# Patient Record
Sex: MALE | Race: WHITE | NOT HISPANIC OR LATINO | Employment: FULL TIME | URBAN - NONMETROPOLITAN AREA
[De-identification: names, ages, dates, MRNs, and addresses within clinical notes are randomized per-mention and may not be internally consistent; named-entity substitution may affect disease eponyms.]

---

## 2022-01-13 ENCOUNTER — OFFICE VISIT (OUTPATIENT)
Dept: NEUROLOGY | Facility: CLINIC | Age: 42
End: 2022-01-13

## 2022-01-13 VITALS
BODY MASS INDEX: 33.88 KG/M2 | TEMPERATURE: 98 F | HEART RATE: 76 BPM | DIASTOLIC BLOOD PRESSURE: 72 MMHG | WEIGHT: 264 LBS | SYSTOLIC BLOOD PRESSURE: 130 MMHG | OXYGEN SATURATION: 98 % | HEIGHT: 74 IN

## 2022-01-13 DIAGNOSIS — G43.009 MIGRAINE WITHOUT AURA AND WITHOUT STATUS MIGRAINOSUS, NOT INTRACTABLE: ICD-10-CM

## 2022-01-13 DIAGNOSIS — R51.9 CHRONIC DAILY HEADACHE: Primary | ICD-10-CM

## 2022-01-13 PROCEDURE — 99204 OFFICE O/P NEW MOD 45 MIN: CPT | Performed by: NURSE PRACTITIONER

## 2022-01-13 RX ORDER — TOPIRAMATE 25 MG/1
25 TABLET ORAL 2 TIMES DAILY
Qty: 60 TABLET | Refills: 1 | Status: SHIPPED | OUTPATIENT
Start: 2022-01-13 | End: 2022-03-10 | Stop reason: SINTOL

## 2022-01-13 RX ORDER — SUMATRIPTAN 50 MG/1
50 TABLET, FILM COATED ORAL ONCE AS NEEDED
Qty: 30 TABLET | Refills: 2 | Status: SHIPPED | OUTPATIENT
Start: 2022-01-13 | End: 2022-07-21

## 2022-01-13 RX ORDER — IBUPROFEN 200 MG
200 TABLET ORAL EVERY 6 HOURS PRN
COMMUNITY

## 2022-01-13 NOTE — PROGRESS NOTES
New Neurology Patient Office Visit      Patient Name: Carlos Garza    Referring Physician: Angelic Tan, *    Chief Complaint:    Chief Complaint   Patient presents with   • Advice Only     Pt in office for headaches       History of Present Illness: Carlos Garza is a 41 y.o. male who is here today to establish care with Neurology.  He was referred for evaluation of persistent daily headache.  Has been experiencing bifrontal headache for about one year.  Describes this as focused on sides of forehead.    Age onset: headaches since age early 30's, 1st migraine in past year.     Migraine Sx: Intense pain, photophobia, difficulty concentrating, mild nausea, right sided   Aleve may dull but does not completely alleviate    HAD/week: 7    Migraine frequnecy: 1 in past year    Meds tried: Aleve occasionally    Family Hx:no    Previous imaging:  Head CT, no acute intracranial abnormality  Spring allergies, none recurrent     No history of kidney stones  Has been taking Testosterone since 2017    Is concerned that he may had have 'exposure to something' while [place].   Last eye exam about 4 months ago    Reports poor sleep, typical time to bed 10pm and wakes by 5. Estimates 6 hours of uninterrupted sleep. Had previous sleep study, negative for ROBERTA. Rare daytime fatigue. May sleep up to 12 hours on off days.     Daily 1 Monster or Sunny, 1 soda at lunch , and tea throughout day    The following portions of the patient's history were reviewed and updated as appropriate: allergies, current medications, past family history, past medical history, past social history, past surgical history and problem list.    Subjective      Review of Systems:   Review of Systems   Neurological: Positive for headache.   All other systems reviewed and are negative.    Past Medical History: History reviewed. No pertinent past medical history.    Past Surgical History:   Past Surgical History:   Procedure Laterality Date   •  "APPENDECTOMY       Family History: History reviewed. No pertinent family history.    Social History:   Social History     Socioeconomic History   • Marital status: Single   Tobacco Use   • Smoking status: Never Smoker   • Smokeless tobacco: Never Used   Vaping Use   • Vaping Use: Never used   Substance and Sexual Activity   • Alcohol use: Never   • Drug use: Never   • Sexual activity: Defer     Medications:     Current Outpatient Medications:   •  ibuprofen (ADVIL,MOTRIN) 200 MG tablet, Take 200 mg by mouth Every 6 (Six) Hours As Needed for Mild Pain ., Disp: , Rfl:   •  NANDROLONE-TESTOSTERONE CYP&EN IJ, Inject  as directed., Disp: , Rfl:   •  SUMAtriptan (Imitrex) 50 MG tablet, Take 1 tablet by mouth 1 (One) Time As Needed for Migraine. Take one tablet at onset of headache. May repeat dose one time in 2 hours if headache not relieved., Disp: 30 tablet, Rfl: 2  •  topiramate (Topamax) 25 MG tablet, Take 1 tablet by mouth 2 (Two) Times a Day., Disp: 60 tablet, Rfl: 1    Allergies:   No Known Allergies    Objective     Physical Exam:  Vital Signs:   Vitals:    01/13/22 1325   BP: 130/72   Pulse: 76   Temp: 98 °F (36.7 °C)   SpO2: 98%   Weight: 120 kg (264 lb)   Height: 188 cm (74\")   PainSc:   3   PainLoc: Head       Physical Exam  Vitals and nursing note reviewed.   Eyes:      Extraocular Movements: EOM normal.      Pupils: Pupils are equal, round, and reactive to light.   Neck:      Vascular: No carotid bruit.   Cardiovascular:      Rate and Rhythm: Regular rhythm.      Heart sounds: Normal heart sounds.   Pulmonary:      Effort: Pulmonary effort is normal.      Breath sounds: Normal breath sounds.   Skin:     General: Skin is warm.   Neurological:      General: No focal deficit present.      Mental Status: He is oriented to person, place, and time.      Coordination: Heel to Shin Test and Romberg Test normal.      Gait: Gait is intact. Tandem walk normal.      Deep Tendon Reflexes: Strength normal.   Psychiatric:  "        Mood and Affect: Mood normal.         Speech: Speech normal.         Behavior: Behavior normal.         Neurologic Exam     Mental Status   Oriented to person, place, and time.   Attention: normal. Concentration: normal.   Speech: speech is normal   Level of consciousness: alert  Normal comprehension.     Cranial Nerves     CN II   Visual fields full to confrontation.   Visual acuity: normal    CN III, IV, VI   Pupils are equal, round, and reactive to light.  Extraocular motions are normal.   Right pupil: Shape: regular. Reactivity: brisk.   Left pupil: Shape: regular. Reactivity: brisk.   Diplopia: none  Ophthalmoparesis: none  Upgaze: normal  Downgaze: normal    CN V   Facial sensation intact.     CN VII   Facial expression full, symmetric.     CN VIII   CN VIII normal.     CN IX, X   CN IX normal.   CN X normal.     CN XI   CN XI normal.     CN XII   CN XII normal.     Motor Exam   Muscle bulk: normal  Overall muscle tone: normal  Right arm pronator drift: absent  Left arm pronator drift: absent    Strength   Strength 5/5 throughout.     Sensory Exam   Light touch normal.     Gait, Coordination, and Reflexes     Gait  Gait: normal    Coordination   Romberg: negative  Heel to shin coordination: normal  Tandem walking coordination: normal    Tremor   Resting tremor: absent    Reflexes   Reflexes 2+ except as noted.      Results Review:   I reviewed the patient's new clinical results.  I have reviewed the patient's other medical records to include, labs, radiology and referrals.     Assessment / Plan      Assessment/Plan:   Diagnoses and all orders for this visit:    1. Chronic daily headache (Primary)  -     MRI Brain Without Contrast; Future  -     topiramate (Topamax) 25 MG tablet; Take 1 tablet by mouth 2 (Two) Times a Day.  Dispense: 60 tablet; Refill: 1    2. Migraine without aura and without status migrainosus, not intractable  -     SUMAtriptan (Imitrex) 50 MG tablet; Take 1 tablet by mouth 1 (One)  Time As Needed for Migraine. Take one tablet at onset of headache. May repeat dose one time in 2 hours if headache not relieved.  Dispense: 30 tablet; Refill: 2  -     topiramate (Topamax) 25 MG tablet; Take 1 tablet by mouth 2 (Two) Times a Day.  Dispense: 60 tablet; Refill: 1    Discussed pathology of migraine, as well as chronic daily headache.  I discussed that chronic daily headache may be due to poor sleep, excessive use of caffeine, or side effect from testosterone.  I discussed with patient that it is uncertain if headache can be due to previous exposure while [place], we can only treat headache symptomatically moving forward.  I have offered options for daily headache prophylaxis including beta-blockers, topiramate, or antidepressant such as amitriptyline or venlafaxine.  Patient states that he carries a firearm at work, and cannot have anything mood altering.  I explained that any medication is potentially mood altering, and if he experiences side effects he should contact me and we can discuss other options.  I have also advised him to improve sleep hygiene, including consistent sleep-wake cycle.  I have also counseled him to limit caffeinated beverage to no more than 1/day, and avoid excessively caffeinated drinks such as Monster as well as artificial sweeteners.  Brain MRI has been requested upon consideration of vascular or structural abnormalities which may be contributing to persistent daily headache and migraine.    Follow Up:   Return in about 6 weeks (around 2/24/2022) for Next scheduled follow up.     Mayra Dubose APRAILYN  [facility]   AS THE PROVIDER, I PERSONALLY WORE PPE DURING ENTIRE FACE TO FACE ENCOUNTER IN CLINIC WITH THE PATIENT. PATIENT ALSO WORE PPE DURING ENTIRE FACE TO FACE ENCOUNTER EXCEPT FOR A MAX OF 30 SECONDS DURING NEUROLOGICAL EVALUATION OF CRANIAL NERVES AND THEN MASK WAS PLACED BACK OVER PATIENT FACE FOR REMAINDER OF VISIT. I WASHED MY HANDS  BEFORE AND AFTER VISIT.    Please note that portions of this note may have been completed with a voice recognition program. Efforts were made to edit the dictations, but occasionally words are mistranscribed.

## 2022-01-13 NOTE — PATIENT INSTRUCTIONS
Sumatriptan tablets  What is this medicine?  SUMATRIPTAN (ayala ma TRIP tan) is used to treat migraines with or without aura. An aura is a strange feeling or visual disturbance that warns you of an attack. It is not used to prevent migraines.  This medicine may be used for other purposes; ask your health care provider or pharmacist if you have questions.  COMMON BRAND NAME(S): Imitrex, Migraine Pack  What should I tell my health care provider before I take this medicine?  They need to know if you have any of these conditions:  · cigarette smoker  · circulation problems in fingers and toes  · diabetes  · heart disease  · high blood pressure  · high cholesterol  · history of irregular heartbeat  · history of stroke  · kidney disease  · liver disease  · stomach or intestine problems  · an unusual or allergic reaction to sumatriptan, other medicines, foods, dyes, or preservatives  · pregnant or trying to get pregnant  · breast-feeding  How should I use this medicine?  Take this medicine by mouth with a glass of water. Follow the directions on the prescription label. Do not take it more often than directed.  Talk to your pediatrician regarding the use of this medicine in children. Special care may be needed.  Overdosage: If you think you have taken too much of this medicine contact a poison control center or emergency room at once.  NOTE: This medicine is only for you. Do not share this medicine with others.  What if I miss a dose?  This does not apply. This medicine is not for regular use.  What may interact with this medicine?  Do not take this medicine with any of the following medicines:  · certain medicines for migraine headache like almotriptan, eletriptan, frovatriptan, naratriptan, rizatriptan, sumatriptan, zolmitriptan  · ergot alkaloids like dihydroergotamine, ergonovine, ergotamine, methylergonovine  · MAOIs like Carbex, Eldepryl, Marplan, Nardil, and Parnate  This medicine may also interact with the following  medications:  · certain medicines for depression, anxiety, or psychotic disorders  This list may not describe all possible interactions. Give your health care provider a list of all the medicines, herbs, non-prescription drugs, or dietary supplements you use. Also tell them if you smoke, drink alcohol, or use illegal drugs. Some items may interact with your medicine.  What should I watch for while using this medicine?  Visit your healthcare professional for regular checks on your progress. Tell your healthcare professional if your symptoms do not start to get better or if they get worse.  You may get drowsy or dizzy. Do not drive, use machinery, or do anything that needs mental alertness until you know how this medicine affects you. Do not stand up or sit up quickly, especially if you are an older patient. This reduces the risk of dizzy or fainting spells. Alcohol may interfere with the effect of this medicine.  Tell your healthcare professional right away if you have any change in your eyesight.  If you take migraine medicines for 10 or more days a month, your migraines may get worse. Keep a diary of headache days and medicine use. Contact your healthcare professional if your migraine attacks occur more frequently.  What side effects may I notice from receiving this medicine?  Side effects that you should report to your doctor or health care professional as soon as possible:  · allergic reactions like skin rash, itching or hives, swelling of the face, lips, or tongue  · changes in vision  · chest pain or chest tightness  · signs and symptoms of a dangerous change in heartbeat or heart rhythm like chest pain; dizziness; fast, irregular heartbeat; palpitations; feeling faint or lightheaded; falls; breathing problems  · signs and symptoms of a stroke like changes in vision; confusion; trouble speaking or understanding; severe headaches; sudden numbness or weakness of the face, arm or leg; trouble walking; dizziness;  loss of balance or coordination  · signs and symptoms of serotonin syndrome like irritable; confusion; diarrhea; fast or irregular heartbeat; muscle twitching; stiff muscles; trouble walking; sweating; high fever; seizures; chills; vomiting  Side effects that usually do not require medical attention (report to your doctor or health care professional if they continue or are bothersome):  · diarrhea  · dizziness  · drowsiness  · dry mouth  · headache  · nausea, vomiting  · pain, tingling, numbness in the hands or feet  · stomach pain  This list may not describe all possible side effects. Call your doctor for medical advice about side effects. You may report side effects to FDA at 0-363-VKN-9785.  Where should I keep my medicine?  Keep out of the reach of children.  Store at room temperature between 2 and 30 degrees C (36 and 86 degrees F). Throw away any unused medicine after the expiration date.  NOTE: This sheet is a summary. It may not cover all possible information. If you have questions about this medicine, talk to your doctor, pharmacist, or health care provider.  © 2021 Elsevier/Gold Standard (2019-07-02 15:05:37)    General Headache Without Cause  A headache is pain or discomfort felt around the head or neck area. The specific cause of a headache may not be found. There are many causes and types of headaches. A few common ones are:  · Tension headaches.  · Migraine headaches.  · Cluster headaches.  · Chronic daily headaches.  Follow these instructions at home:  Watch your condition for any changes. Let your health care provider know about them. Take these steps to help with your condition:  Managing pain         · Take over-the-counter and prescription medicines only as told by your health care provider.  · Lie down in a dark, quiet room when you have a headache.  · If directed, put ice on your head and neck area:  ? Put ice in a plastic bag.  ? Place a towel between your skin and the bag.  ? Leave the ice on  for 20 minutes, 2-3 times per day.  · If directed, apply heat to the affected area. Use the heat source that your health care provider recommends, such as a moist heat pack or a heating pad.  ? Place a towel between your skin and the heat source.  ? Leave the heat on for 20-30 minutes.  ? Remove the heat if your skin turns bright red. This is especially important if you are unable to feel pain, heat, or cold. You may have a greater risk of getting burned.  · Keep lights dim if bright lights bother you or make your headaches worse.  Eating and drinking  · Eat meals on a regular schedule.  · If you drink alcohol:  ? Limit how much you use to:  § 0-1 drink a day for women.  § 0-2 drinks a day for men.  ? Be aware of how much alcohol is in your drink. In the [place] one drink equals one 12 oz bottle of beer (355 mL), one 5 oz glass of wine (148 mL), or one 1½ oz glass of hard liquor (44 mL).  · Stop drinking caffeine, or decrease the amount of caffeine you drink.  General instructions    · Keep a headache journal to help find out what may trigger your headaches. For example, write down:  ? What you eat and drink.  ? How much sleep you get.  ? Any change to your diet or medicines.  · Try massage or other relaxation techniques.  · Limit stress.  · Sit up straight, and do not tense your muscles.  · Do not use any products that contain nicotine or tobacco, such as cigarettes, e-cigarettes, and chewing tobacco. If you need help quitting, ask your health care provider.  · Exercise regularly as told by your health care provider.  · Sleep on a regular schedule. Get 7-9 hours of sleep each night, or the amount recommended by your health care provider.  · Keep all follow-up visits as told by your health care provider. This is important.    Contact a health care provider if:  · Your symptoms are not helped by medicine.  · You have a headache that is different from the usual headache.  · You have nausea or you vomit.  · You have a  fever.  Get help right away if:  · Your headache becomes severe quickly.  · Your headache gets worse after moderate to intense physical activity.  · You have repeated vomiting.  · You have a stiff neck.  · You have a loss of vision.  · You have problems with speech.  · You have pain in the eye or ear.  · You have muscular weakness or loss of muscle control.  · You lose your balance or have trouble walking.  · You feel faint or pass out.  · You have confusion.  · You have a seizure.  Summary  · A headache is pain or discomfort felt around the head or neck area.  · There are many causes and types of headaches. In some cases, the cause may not be found.  · Keep a headache journal to help find out what may trigger your headaches. Watch your condition for any changes. Let your health care provider know about them.  · Contact a health care provider if you have a headache that is different from the usual headache, or if your symptoms are not helped by medicine.  · Get help right away if your headache becomes severe, you vomit, you have a loss of vision, you lose your balance, or you have a seizure.  This information is not intended to replace advice given to you by your health care provider. Make sure you discuss any questions you have with your health care provider.  Document Revised: 07/08/2019 Document Reviewed: 07/08/2019  Elsevier Patient Education © 2021 Elsevier Inc.

## 2022-01-14 ENCOUNTER — TELEPHONE (OUTPATIENT)
Dept: NEUROLOGY | Facility: CLINIC | Age: 42
End: 2022-01-14

## 2022-01-14 NOTE — TELEPHONE ENCOUNTER
A LADY WITH [facility] STATING THAT THEY RECEIVED A FAX FOR A Angelic Tan MD, AND SHE IS NOT THERE, SHE SAID YOU HAVE THE WRONG FAX NUMBER. [phone] IS THIERS BUT DO NOT HAVE Angelic Tan MD THERE.

## 2022-02-04 ENCOUNTER — HOSPITAL ENCOUNTER (OUTPATIENT)
Dept: MRI IMAGING | Facility: HOSPITAL | Age: 42
End: 2022-02-04

## 2022-02-18 ENCOUNTER — HOSPITAL ENCOUNTER (OUTPATIENT)
Dept: MRI IMAGING | Facility: HOSPITAL | Age: 42
Discharge: HOME | End: 2022-02-18
Admitting: NURSE PRACTITIONER

## 2022-02-18 DIAGNOSIS — R51.9 CHRONIC DAILY HEADACHE: ICD-10-CM

## 2022-02-18 PROCEDURE — 70551 MRI BRAIN STEM W/O DYE: CPT

## 2022-03-10 ENCOUNTER — OFFICE VISIT (OUTPATIENT)
Dept: NEUROLOGY | Facility: CLINIC | Age: 42
End: 2022-03-10

## 2022-03-10 VITALS
BODY MASS INDEX: 33.25 KG/M2 | HEART RATE: 90 BPM | TEMPERATURE: 97.8 F | DIASTOLIC BLOOD PRESSURE: 82 MMHG | WEIGHT: 259 LBS | OXYGEN SATURATION: 97 % | SYSTOLIC BLOOD PRESSURE: 120 MMHG

## 2022-03-10 DIAGNOSIS — G43.009 MIGRAINE WITHOUT AURA AND WITHOUT STATUS MIGRAINOSUS, NOT INTRACTABLE: ICD-10-CM

## 2022-03-10 DIAGNOSIS — R51.9 CHRONIC DAILY HEADACHE: Primary | ICD-10-CM

## 2022-03-10 PROCEDURE — 99213 OFFICE O/P EST LOW 20 MIN: CPT | Performed by: NURSE PRACTITIONER

## 2022-03-10 RX ORDER — TOPIRAMATE 100 MG/1
100 CAPSULE, EXTENDED RELEASE ORAL DAILY
Qty: 14 CAPSULE | Refills: 0 | COMMUNITY
Start: 2022-03-10 | End: 2022-03-11 | Stop reason: CLARIF

## 2022-03-10 RX ORDER — TOPIRAMATE 100 MG/1
100 CAPSULE, EXTENDED RELEASE ORAL DAILY
Qty: 30 CAPSULE | Refills: 5 | Status: SHIPPED | OUTPATIENT
Start: 2022-03-10 | End: 2022-03-11 | Stop reason: CLARIF

## 2022-03-10 RX ORDER — TOPIRAMATE 100 MG/1
100 CAPSULE, EXTENDED RELEASE ORAL DAILY
Qty: 30 CAPSULE | Refills: 5 | Status: SHIPPED | OUTPATIENT
Start: 2022-03-10 | End: 2022-03-10

## 2022-03-10 NOTE — PROGRESS NOTES
Follow Up Neurology Office Visit      Patient Name: Carlos Garza    Referring Physician: No ref. provider found    Chief Complaint:    Chief Complaint   Patient presents with   • Follow-up     Pt in office to follow up on headaches       History of Present Illness: Carlos Garza is a 41 y.o. male who is here to follow up with Neurology for headaches.   Has noted that since turning fans off in cold weather, he has tinnitus and and has more pounding in head.   He has history of working around machinery, [facility]     Has used Imitrex once, notes he does get migraine when he is on night shift.   Some fatigue with topiramate, but does feel that it has reduced headache severity.     The following portions of the patient's history were reviewed and updated as appropriate: allergies, current medications, past family history, past medical history, past social history, past surgical history and problem list.    Subjective     Review of Systems:   Review of Systems   HENT: Positive for tinnitus.    Neurological: Positive for headache.   All other systems reviewed and are negative.    Medications:     Current Outpatient Medications:   •  ibuprofen (ADVIL,MOTRIN) 200 MG tablet, Take 200 mg by mouth Every 6 (Six) Hours As Needed for Mild Pain ., Disp: , Rfl:   •  NANDROLONE-TESTOSTERONE CYP&EN IJ, Inject  as directed., Disp: , Rfl:   •  SUMAtriptan (Imitrex) 50 MG tablet, Take 1 tablet by mouth 1 (One) Time As Needed for Migraine. Take one tablet at onset of headache. May repeat dose one time in 2 hours if headache not relieved., Disp: 30 tablet, Rfl: 2  •  topiramate (Topamax) 50 MG tablet, Take 1 tablet by mouth 2 (Two) Times a Day., Disp: 180 tablet, Rfl: 1    Allergies:   No Known Allergies    Objective     Physical Exam:  Vital Signs:   Vitals:    03/10/22 1439   BP: 120/82   Pulse: 90   Temp: 97.8 °F (36.6 °C)   SpO2: 97%   Weight: 117 kg (259 lb)   PainSc:   8   PainLoc: Ankle  Comment: head 3      Physical Exam  Vitals and nursing note reviewed.   Constitutional:       Appearance: Normal appearance.   Pulmonary:      Effort: Pulmonary effort is normal.   Neurological:      Mental Status: He is oriented to person, place, and time. Mental status is at baseline.      Gait: Gait is intact.      Deep Tendon Reflexes: Strength normal.   Psychiatric:         Mood and Affect: Mood normal.         Speech: Speech normal.         Behavior: Behavior normal.       Neurologic Exam     Mental Status   Oriented to person, place, and time.   Attention: normal. Concentration: normal.   Speech: speech is normal   Level of consciousness: alert  Normal comprehension.     Cranial Nerves   Cranial nerves II through XII intact.     Motor Exam   Muscle bulk: normal  Overall muscle tone: normal    Strength   Strength 5/5 throughout.     Gait, Coordination, and Reflexes     Gait  Gait: normal    Tremor   Resting tremor: absent    Results Review:   I reviewed the patient's new clinical results.  I have reviewed the patient's other medical records to include, labs, radiology and referrals.     Assessment / Plan      Assessment/Plan:   Diagnoses and all orders for this visit:    1. Chronic daily headache (Primary)  -     Discontinue: Topiramate ER (Trokendi XR) 100 MG capsule sustained-release 24 hr; Take 1 capsule by mouth Daily.  Dispense: 30 capsule; Refill: 5  -     Discontinue: Trokendi  MG capsule sustained-release 24 hr; Take 1 capsule by mouth Daily.  Dispense: 30 capsule; Refill: 5    2. Migraine without aura and without status migrainosus, not intractable  -     Discontinue: Topiramate ER (Trokendi XR) 100 MG capsule sustained-release 24 hr; Take 1 capsule by mouth Daily.  Dispense: 30 capsule; Refill: 5  -     Discontinue: Trokendi  MG capsule sustained-release 24 hr; Take 1 capsule by mouth Daily.  Dispense: 30 capsule; Refill: 5  -     Discontinue: Topiramate ER (Trokendi XR) 100 MG capsule sustained-release  24 hr; Take 1 capsule by mouth Daily.  Dispense: 14 capsule; Refill: 0     Patient has had benefit with topiramate, however continues to have several headaches per week. His rotating schedule makes it difficult to take topiramate BID, we discussed using once daily Trokendi, he would like to try this and samples provided today.  Update 3/11: Trokendi is not on [facility] formulary, patient has been prescribed topiramate 50mg BID. Consider higher dose once daily if difficulty with BID dosing.     Follow Up:   Return in about 6 weeks (around 4/21/2022) for Next scheduled follow up.     Mayra Dubose [facility]   AS THE PROVIDER, I PERSONALLY WORE PPE DURING ENTIRE FACE TO FACE ENCOUNTER IN CLINIC WITH THE PATIENT. PATIENT ALSO WORE PPE DURING ENTIRE FACE TO FACE ENCOUNTER EXCEPT FOR A MAX OF 30 SECONDS DURING NEUROLOGICAL EVALUATION OF CRANIAL NERVES AND THEN MASK WAS PLACED BACK OVER PATIENT FACE FOR REMAINDER OF VISIT. I WASHED MY HANDS BEFORE AND AFTER VISIT.    Please note that portions of this note may have been completed with a voice recognition program. Efforts were made to edit the dictations, but occasionally words are mistranscribed.

## 2022-03-11 DIAGNOSIS — G43.009 MIGRAINE WITHOUT AURA AND WITHOUT STATUS MIGRAINOSUS, NOT INTRACTABLE: ICD-10-CM

## 2022-03-11 DIAGNOSIS — R51.9 CHRONIC DAILY HEADACHE: Primary | ICD-10-CM

## 2022-03-11 RX ORDER — TOPIRAMATE 50 MG/1
50 TABLET, FILM COATED ORAL 2 TIMES DAILY
Qty: 180 TABLET | Refills: 1 | Status: SHIPPED | OUTPATIENT
Start: 2022-03-11 | End: 2022-07-21 | Stop reason: SDUPTHER

## 2022-04-21 ENCOUNTER — OFFICE VISIT (OUTPATIENT)
Dept: NEUROLOGY | Facility: CLINIC | Age: 42
End: 2022-04-21

## 2022-04-21 VITALS
OXYGEN SATURATION: 99 % | WEIGHT: 260 LBS | SYSTOLIC BLOOD PRESSURE: 120 MMHG | BODY MASS INDEX: 33.37 KG/M2 | TEMPERATURE: 97.7 F | HEIGHT: 74 IN | DIASTOLIC BLOOD PRESSURE: 70 MMHG | HEART RATE: 61 BPM

## 2022-04-21 DIAGNOSIS — R51.9 CHRONIC DAILY HEADACHE: Primary | ICD-10-CM

## 2022-04-21 DIAGNOSIS — G43.009 MIGRAINE WITHOUT AURA AND WITHOUT STATUS MIGRAINOSUS, NOT INTRACTABLE: ICD-10-CM

## 2022-04-21 PROCEDURE — 99213 OFFICE O/P EST LOW 20 MIN: CPT | Performed by: NURSE PRACTITIONER

## 2022-04-21 RX ORDER — DULOXETIN HYDROCHLORIDE 30 MG/1
30 CAPSULE, DELAYED RELEASE ORAL DAILY
Qty: 90 CAPSULE | Refills: 1 | Status: SHIPPED | OUTPATIENT
Start: 2022-04-21 | End: 2022-07-21 | Stop reason: SDUPTHER

## 2022-04-21 NOTE — PROGRESS NOTES
Follow Up Neurology Office Visit      Patient Name: Carlos Garza    Referring Physician: No ref. provider found    Chief Complaint:    Chief Complaint   Patient presents with   • Follow-up     Pt in office to follow up on headaches       History of Present Illness: Carlos Garza is a 42 y.o. male who is here to follow up with Neurology for  Headaches. Continues to report persistent daily headache.  He thinks topiramate BID has been helpful at reducing intensity.  He has used Imitrex twice, he does feel that it relieved headache the first time he tried it, second time he states it was not as helpful.  He did not take a second dose.  He endorses generalized body aches, mostly in legs and back.    The following portions of the patient's history were reviewed and updated as appropriate: allergies, current medications, past family history, past medical history, past social history, past surgical history and problem list.    Subjective     Review of Systems:   Review of Systems   Musculoskeletal: Positive for arthralgias and back pain.   Neurological: Positive for headache.   All other systems reviewed and are negative.    Medications:     Current Outpatient Medications:   •  ibuprofen (ADVIL,MOTRIN) 200 MG tablet, Take 200 mg by mouth Every 6 (Six) Hours As Needed for Mild Pain ., Disp: , Rfl:   •  NANDROLONE-TESTOSTERONE CYP&EN IJ, Inject  as directed., Disp: , Rfl:   •  SUMAtriptan (Imitrex) 50 MG tablet, Take 1 tablet by mouth 1 (One) Time As Needed for Migraine. Take one tablet at onset of headache. May repeat dose one time in 2 hours if headache not relieved., Disp: 30 tablet, Rfl: 2  •  topiramate (Topamax) 50 MG tablet, Take 1 tablet by mouth 2 (Two) Times a Day., Disp: 180 tablet, Rfl: 1  •  DULoxetine (Cymbalta) 30 MG capsule, Take 1 capsule by mouth Daily., Disp: 90 capsule, Rfl: 1    Allergies:   No Known Allergies    Objective     Physical Exam:  Vital Signs:   Vitals:    04/21/22 0832   BP: 120/70   Pulse:  "61   Temp: 97.7 °F (36.5 °C)   SpO2: 99%   Weight: 118 kg (260 lb)   Height: 188 cm (74\")   PainSc:   7       Physical Exam  Vitals and nursing note reviewed.   Constitutional:       Appearance: Normal appearance.   Pulmonary:      Effort: Pulmonary effort is normal.   Neurological:      Mental Status: He is oriented to person, place, and time. Mental status is at baseline.      Gait: Gait is intact.      Deep Tendon Reflexes: Strength normal.   Psychiatric:         Mood and Affect: Mood normal.         Speech: Speech normal.         Behavior: Behavior normal.         Neurologic Exam     Mental Status   Oriented to person, place, and time.   Attention: normal. Concentration: normal.   Speech: speech is normal   Level of consciousness: alert  Normal comprehension.     Cranial Nerves   Cranial nerves II through XII intact.     Motor Exam   Muscle bulk: normal  Overall muscle tone: normal    Strength   Strength 5/5 throughout.     Gait, Coordination, and Reflexes     Gait  Gait: normal    Tremor   Resting tremor: absent    Results Review:   I reviewed the patient's new clinical results.  I have reviewed the patient's other medical records to include, labs, radiology and referrals.     Assessment / Plan      Assessment/Plan:   Diagnoses and all orders for this visit:    1. Chronic daily headache (Primary)  -     DULoxetine (Cymbalta) 30 MG capsule; Take 1 capsule by mouth Daily.  Dispense: 90 capsule; Refill: 1    2. Migraine without aura and without status migrainosus, not intractable  -     DULoxetine (Cymbalta) 30 MG capsule; Take 1 capsule by mouth Daily.  Dispense: 90 capsule; Refill: 1    We discussed that SNRIs can be helpful for both headaches and chronic pain, he continues to state that he cannot take anything mood altering due to his job and carrying a firearm.  I have explained that all medications have potential for altered mental status, but he has tolerated topiramate well and this should not be sedating " or cause erratic behavior.  I have encouraged him to contact me if this happens.  He will begin Cymbalta for relief of headaches and generalized body aches.  Continue topiramate twice daily.  Continue sumatriptan as needed for migraine attacks.    Follow Up:   Return in about 3 months (around 7/21/2022) for Next scheduled follow up.     Mayra Dubose APRAILYN  [facility]   AS THE PROVIDER, I PERSONALLY WORE PPE DURING ENTIRE FACE TO FACE ENCOUNTER IN CLINIC WITH THE PATIENT. PATIENT ALSO WORE PPE DURING ENTIRE FACE TO FACE ENCOUNTER EXCEPT FOR A MAX OF 30 SECONDS DURING NEUROLOGICAL EVALUATION OF CRANIAL NERVES AND THEN MASK WAS PLACED BACK OVER PATIENT FACE FOR REMAINDER OF VISIT. I WASHED MY HANDS BEFORE AND AFTER VISIT.    Please note that portions of this note may have been completed with a voice recognition program. Efforts were made to edit the dictations, but occasionally words are mistranscribed.

## 2022-07-21 ENCOUNTER — TELEPHONE (OUTPATIENT)
Dept: NEUROLOGY | Facility: CLINIC | Age: 42
End: 2022-07-21

## 2022-07-21 ENCOUNTER — OFFICE VISIT (OUTPATIENT)
Dept: NEUROLOGY | Facility: CLINIC | Age: 42
End: 2022-07-21

## 2022-07-21 VITALS
WEIGHT: 262.4 LBS | DIASTOLIC BLOOD PRESSURE: 64 MMHG | TEMPERATURE: 98.2 F | SYSTOLIC BLOOD PRESSURE: 120 MMHG | OXYGEN SATURATION: 98 % | BODY MASS INDEX: 33.67 KG/M2 | HEIGHT: 74 IN | HEART RATE: 80 BPM

## 2022-07-21 DIAGNOSIS — G43.009 MIGRAINE WITHOUT AURA AND WITHOUT STATUS MIGRAINOSUS, NOT INTRACTABLE: ICD-10-CM

## 2022-07-21 DIAGNOSIS — G43.009 MIGRAINE WITHOUT AURA AND WITHOUT STATUS MIGRAINOSUS, NOT INTRACTABLE: Primary | ICD-10-CM

## 2022-07-21 DIAGNOSIS — R51.9 CHRONIC DAILY HEADACHE: ICD-10-CM

## 2022-07-21 PROCEDURE — 99213 OFFICE O/P EST LOW 20 MIN: CPT | Performed by: NURSE PRACTITIONER

## 2022-07-21 RX ORDER — DULOXETIN HYDROCHLORIDE 60 MG/1
60 CAPSULE, DELAYED RELEASE ORAL DAILY
Qty: 90 CAPSULE | Refills: 1 | Status: SHIPPED | OUTPATIENT
Start: 2022-07-21 | End: 2023-07-21

## 2022-07-21 RX ORDER — TOPIRAMATE 50 MG/1
TABLET, FILM COATED ORAL
Qty: 270 TABLET | Refills: 1 | Status: SHIPPED | OUTPATIENT
Start: 2022-07-21

## 2022-07-21 RX ORDER — RIZATRIPTAN BENZOATE 10 MG/1
10 TABLET ORAL ONCE AS NEEDED
Qty: 9 TABLET | Refills: 3 | Status: SHIPPED | OUTPATIENT
Start: 2022-07-21 | End: 2022-07-22 | Stop reason: CLARIF

## 2022-07-21 NOTE — TELEPHONE ENCOUNTER
[facility] called and stated that the rizatriptan is non-formulary and the alternative is eletriptan. I let them know I would see what you wanted to do and get back with them.   Please advise

## 2022-07-21 NOTE — PROGRESS NOTES
Follow Up Neurology Office Visit      Patient Name: Carlos Garza    Referring Physician: No ref. provider found    Chief Complaint:    Chief Complaint   Patient presents with   • Follow-up     Pt in office to follow up on chronic daily headache       History of Present Illness: Carlos Garza is a 42 y.o. male who is here to follow up with Neurology for  Headaches. Continues to have frequent daily headaches. More frequent migraine episodes, 1-2 per week. Had 3 MHD in the past week. Inconsistent relief with Imitrex. Does think Cymbalta helped with body aches.  Has had previous sleep testing, negative for sleep apnea.     The following portions of the patient's history were reviewed and updated as appropriate: allergies, current medications, past family history, past medical history, past social history, past surgical history and problem list.    Subjective     Review of Systems:   Review of Systems   Musculoskeletal: Positive for arthralgias.   Neurological: Positive for headache.   All other systems reviewed and are negative.    Medications:     Current Outpatient Medications:   •  DULoxetine (Cymbalta) 60 MG capsule, Take 1 capsule by mouth Daily., Disp: 90 capsule, Rfl: 1  •  ibuprofen (ADVIL,MOTRIN) 200 MG tablet, Take 200 mg by mouth Every 6 (Six) Hours As Needed for Mild Pain ., Disp: , Rfl:   •  NANDROLONE-TESTOSTERONE CYP&EN IJ, Inject  as directed., Disp: , Rfl:   •  topiramate (Topamax) 50 MG tablet, Take 1 tablet by mouth Every Morning AND 2 tablets Every Night., Disp: 270 tablet, Rfl: 1  •  rizatriptan (Maxalt) 10 MG tablet, Take 1 tablet by mouth 1 (One) Time As Needed for Migraine. May repeat in 2 hours if needed, Disp: 9 tablet, Rfl: 3    Allergies:   No Known Allergies    Objective     Physical Exam:  Vital Signs:   Vitals:    07/21/22 1306   BP: 120/64   BP Location: Left arm   Patient Position: Lying   Cuff Size: Adult   Pulse: 80   Temp: 98.2 °F (36.8 °C)   SpO2: 98%   Weight: 119 kg (262 lb 6.4  "oz)   Height: 188 cm (74\")   PainSc:   6   PainLoc: Head  Comment: ankle     Physical Exam  Vitals and nursing note reviewed.   Constitutional:       Appearance: Normal appearance.   Pulmonary:      Effort: Pulmonary effort is normal.   Neurological:      Mental Status: He is oriented to person, place, and time. Mental status is at baseline.      Gait: Gait is intact.      Deep Tendon Reflexes: Strength normal.   Psychiatric:         Mood and Affect: Mood normal.         Speech: Speech normal.         Behavior: Behavior normal.       Neurologic Exam     Mental Status   Oriented to person, place, and time.   Attention: normal. Concentration: normal.   Speech: speech is normal   Level of consciousness: alert  Normal comprehension.     Cranial Nerves   Cranial nerves II through XII intact.     Motor Exam   Muscle bulk: normal  Overall muscle tone: normal    Strength   Strength 5/5 throughout.     Gait, Coordination, and Reflexes     Gait  Gait: normal    Tremor   Resting tremor: absent    Results Review:   I have reviewed the patient's other medical records to include, labs, radiology and referrals.     Assessment / Plan      Assessment/Plan:   Diagnoses and all orders for this visit:    1. Chronic daily headache  -     DULoxetine (Cymbalta) 60 MG capsule; Take 1 capsule by mouth Daily.  Dispense: 90 capsule; Refill: 1  -     topiramate (Topamax) 50 MG tablet; Take 1 tablet by mouth Every Morning AND 2 tablets Every Night.  Dispense: 270 tablet; Refill: 1    2. Migraine without aura and without status migrainosus, not intractable  -     DULoxetine (Cymbalta) 60 MG capsule; Take 1 capsule by mouth Daily.  Dispense: 90 capsule; Refill: 1  -     topiramate (Topamax) 50 MG tablet; Take 1 tablet by mouth Every Morning AND 2 tablets Every Night.  Dispense: 270 tablet; Refill: 1    Other orders  -     rizatriptan (Maxalt) 10 MG tablet; Take 1 tablet by mouth 1 (One) Time As Needed for Migraine. May repeat in 2 hours if needed "  Dispense: 9 tablet; Refill: 3    If no improvement next visit, may consider Botox injections or Qulipta.     Follow Up:   Return in about 4 weeks (around 8/18/2022) for Next scheduled follow up.     RAFAEL Mcghee  [facility]   AS THE PROVIDER, I PERSONALLY WORE PPE DURING ENTIRE FACE TO FACE ENCOUNTER IN CLINIC WITH THE PATIENT. PATIENT ALSO WORE PPE DURING ENTIRE FACE TO FACE ENCOUNTER EXCEPT FOR A MAX OF 30 SECONDS DURING NEUROLOGICAL EVALUATION OF CRANIAL NERVES AND THEN MASK WAS PLACED BACK OVER PATIENT FACE FOR REMAINDER OF VISIT. I WASHED MY HANDS BEFORE AND AFTER VISIT.    Please note that portions of this note may have been completed with a voice recognition program. Efforts were made to edit the dictations, but occasionally words are mistranscribed.

## 2022-07-22 ENCOUNTER — TELEPHONE (OUTPATIENT)
Dept: NEUROLOGY | Facility: CLINIC | Age: 42
End: 2022-07-22

## 2022-07-22 RX ORDER — ELETRIPTAN HYDROBROMIDE 40 MG/1
40 TABLET, FILM COATED ORAL AS NEEDED
Qty: 9 TABLET | Refills: 5 | Status: SHIPPED | OUTPATIENT
Start: 2022-07-22

## 2022-07-22 NOTE — TELEPHONE ENCOUNTER
[facility]  [phone] EXT [phone] OR EXT [phone]    CALLED TO SAY THAT MEDICATIONS ORDERED YESTERDAY FOR THIS PATIENT ALL NEED PRIOR AUTHORIZATION (AUTHORIZATION  22).    NEED TO SEND LAST OFFICE NOTES THAT SUPPORT MEDICATIONS, AND REQUEST FOR SERVICE FORM - CONTINUATION OF CARE TO FAX:    [phone]    THANK YOU